# Patient Record
Sex: MALE | Race: WHITE | NOT HISPANIC OR LATINO | URBAN - METROPOLITAN AREA
[De-identification: names, ages, dates, MRNs, and addresses within clinical notes are randomized per-mention and may not be internally consistent; named-entity substitution may affect disease eponyms.]

---

## 2020-09-28 ENCOUNTER — OFFICE VISIT (OUTPATIENT)
Dept: URBAN - METROPOLITAN AREA CLINIC 121 | Facility: CLINIC | Age: 70
End: 2020-09-28

## 2021-04-21 ENCOUNTER — OFFICE VISIT (OUTPATIENT)
Dept: URBAN - METROPOLITAN AREA CLINIC 63 | Facility: CLINIC | Age: 71
End: 2021-04-21

## 2021-06-01 ENCOUNTER — OFFICE VISIT (OUTPATIENT)
Dept: URBAN - METROPOLITAN AREA SURGERY CENTER 4 | Facility: SURGERY CENTER | Age: 71
End: 2021-06-01

## 2021-09-21 ENCOUNTER — OFFICE VISIT (OUTPATIENT)
Dept: URBAN - METROPOLITAN AREA CLINIC 121 | Facility: CLINIC | Age: 71
End: 2021-09-21

## 2022-01-26 ENCOUNTER — OFFICE VISIT (OUTPATIENT)
Dept: URBAN - METROPOLITAN AREA CLINIC 63 | Facility: CLINIC | Age: 72
End: 2022-01-26

## 2022-05-10 ENCOUNTER — OFFICE VISIT (OUTPATIENT)
Dept: URBAN - METROPOLITAN AREA CLINIC 63 | Facility: CLINIC | Age: 72
End: 2022-05-10

## 2022-06-08 ENCOUNTER — OFFICE VISIT (OUTPATIENT)
Dept: URBAN - METROPOLITAN AREA SURGERY CENTER 4 | Facility: SURGERY CENTER | Age: 72
End: 2022-06-08

## 2022-06-13 LAB — PATHOLOGY (INDENTED REPORT): (no result)

## 2022-06-27 ENCOUNTER — OFFICE VISIT (OUTPATIENT)
Dept: URBAN - METROPOLITAN AREA CLINIC 63 | Facility: CLINIC | Age: 72
End: 2022-06-27

## 2022-06-27 ENCOUNTER — DASHBOARD ENCOUNTERS (OUTPATIENT)
Age: 72
End: 2022-06-27

## 2022-06-27 PROBLEM — 721691004: Status: ACTIVE | Noted: 2022-06-27

## 2022-06-27 PROBLEM — 397881000: Status: ACTIVE | Noted: 2022-06-27

## 2022-06-27 PROBLEM — 721704005: Status: ACTIVE | Noted: 2022-06-27

## 2022-06-27 RX ORDER — NITROGLYCERIN 0.4 MG/1
PRN TABLET SUBLINGUAL
Status: ACTIVE | COMMUNITY

## 2022-06-27 RX ORDER — SITAGLIPTIN 100 MG/1
1 TABLET TABLET, FILM COATED ORAL ONCE A DAY
Status: ACTIVE | COMMUNITY

## 2022-06-27 RX ORDER — METOPROLOL SUCCINATE 25 MG/1
1 TABLET TABLET, FILM COATED, EXTENDED RELEASE ORAL ONCE A DAY
Status: ACTIVE | COMMUNITY

## 2022-06-27 RX ORDER — EMPAGLIFLOZIN 25 MG/1
1 TABLET TABLET, FILM COATED ORAL ONCE A DAY
Status: ACTIVE | COMMUNITY

## 2022-06-27 RX ORDER — ASPIRIN 81 MG/1
1 TABLET TABLET, COATED ORAL ONCE A DAY
Status: ACTIVE | COMMUNITY

## 2022-06-27 RX ORDER — SENNOSIDES 50; 8.6 MG/1; MG/1
ONCE A DAY TABLET ORAL
Status: ACTIVE | COMMUNITY

## 2022-06-27 RX ORDER — METFORMIN HYDROCHLORIDE 1000 MG/1
1 TABLET TABLET, FILM COATED ORAL TWICE A DAY
Status: ACTIVE | COMMUNITY

## 2022-06-27 RX ORDER — LISINOPRIL 2.5 MG/1
1 TABLET TABLET ORAL ONCE A DAY
Status: ACTIVE | COMMUNITY

## 2022-06-27 RX ORDER — ATORVASTATIN CALCIUM 80 MG/1
1 TABLET TABLET, FILM COATED ORAL ONCE A DAY
Status: ACTIVE | COMMUNITY

## 2022-06-27 NOTE — HPI-TODAY'S VISIT:
**************************  Colonoscopy/08 June 2022.  10 mm advanced tubular adenomatous polyp removed from the proximal descending colon.  6 mm hyperplastic polyp removed in the rectum.  Diverticulosis in the descending and sigmoid colon with internal hemorrhoids present.  All remaining findings are negative or benign.  Recommend repeat colonoscopy in 3 years due to advanced adenomatous polyp removed on this procedure.  Lab work dated 26 January 2022 demonstrates the following abnormalities: CO2 21, glucose 142, BUN 22, BUN/creatinine ratio 27.2, hemoglobin A1c 7.3%, HDL 31.  All remaining lab values of CBC, CMP, B12, folate, iron studies and lipid panel are within normal limits.  Lab work dated 29 December 2020 demonstrates the following abnormalities: RBC 4.17, MCH 34.1, glucose 110, hemoglobin A1c 6.1%.  All remaining lab values of CBC, CMP and lipid panel are within normal limits.  Colonoscopy/08 October 2015.  7 mm tubular adenomatous polyp removed from the mid descending colon.  Diverticulosis in the descending and sigmoid colon with internal hemorrhoids present.  All remaining findings are negative or benign.  Recommend repeat colonoscopy in 5 years due to diminutive tubular adenomatous polyp removed on this procedure.

## 2022-07-09 ENCOUNTER — TELEPHONE ENCOUNTER (OUTPATIENT)
Dept: URBAN - METROPOLITAN AREA CLINIC 121 | Facility: CLINIC | Age: 72
End: 2022-07-09

## 2022-07-09 RX ORDER — ATORVASTATIN CALCIUM 80 MG/1
TABLET, FILM COATED ORAL
Refills: 0 | OUTPATIENT
Start: 2021-03-24 | End: 2022-05-10

## 2022-07-09 RX ORDER — METFORMIN HCL 500 MG/1
TABLET ORAL
Refills: 0 | OUTPATIENT
Start: 2015-09-03 | End: 2021-04-20

## 2022-07-09 RX ORDER — CALCIUM CARBONATE/VITAMIN D3 600 MG-10
TABLET ORAL
Refills: 0 | OUTPATIENT
Start: 2015-09-03 | End: 2022-05-10

## 2022-07-09 RX ORDER — METOPROLOL SUCCINATE 25 MG/1
TABLET, EXTENDED RELEASE ORAL
Refills: 0 | OUTPATIENT
Start: 2021-03-24 | End: 2022-05-10

## 2022-07-09 RX ORDER — LISINOPRIL 2.5 MG/1
TABLET ORAL
Refills: 0 | OUTPATIENT
Start: 2021-03-24 | End: 2022-05-10

## 2022-07-09 RX ORDER — METFORMIN HCL 1000 MG/1
TABLET ORAL
Refills: 0 | OUTPATIENT
Start: 2021-01-21 | End: 2022-05-10

## 2022-07-09 RX ORDER — ASPIRIN 81 MG/1
TABLET, CHEWABLE ORAL
Refills: 0 | OUTPATIENT
Start: 2015-09-03 | End: 2022-05-10

## 2022-07-09 RX ORDER — LISINOPRIL 5 MG/1
TABLET ORAL
Refills: 0 | OUTPATIENT
Start: 2015-09-03 | End: 2021-04-20

## 2022-07-09 RX ORDER — SILDENAFIL CITRATE 100 MG/1
TABLET ORAL
Refills: 0 | OUTPATIENT
Start: 2021-04-09 | End: 2022-05-10

## 2022-07-09 RX ORDER — ATORVASTATIN CALCIUM 20 MG/1
TABLET, FILM COATED ORAL
Refills: 0 | OUTPATIENT
Start: 2015-09-03 | End: 2021-04-20

## 2022-07-09 RX ORDER — METOPROLOL SUCCINATE 25 MG/1
TABLET, EXTENDED RELEASE ORAL
Refills: 0 | OUTPATIENT
Start: 2015-09-03 | End: 2022-05-10

## 2022-07-09 RX ORDER — SITAGLIPTIN PHOSPHATE 100 MG
TABLET ORAL
Refills: 0 | OUTPATIENT
Start: 2015-09-03 | End: 2022-05-10

## 2022-07-10 ENCOUNTER — TELEPHONE ENCOUNTER (OUTPATIENT)
Dept: URBAN - METROPOLITAN AREA CLINIC 121 | Facility: CLINIC | Age: 72
End: 2022-07-10

## 2022-07-10 RX ORDER — ATORVASTATIN CALCIUM 80 MG/1
TABLET, FILM COATED ORAL ONCE A DAY
Refills: 0 | Status: ACTIVE | COMMUNITY
Start: 2022-05-10

## 2022-07-10 RX ORDER — LISINOPRIL 2.5 MG/1
TABLET ORAL ONCE A DAY
Refills: 0 | Status: ACTIVE | COMMUNITY
Start: 2022-05-10

## 2022-07-10 RX ORDER — METOPROLOL SUCCINATE 25 MG/1
TABLET, EXTENDED RELEASE ORAL ONCE A DAY
Refills: 0 | Status: ACTIVE | COMMUNITY
Start: 2022-05-10

## 2022-07-10 RX ORDER — EMPAGLIFLOZIN 25 MG/1
TABLET, FILM COATED ORAL ONCE A DAY
Refills: 0 | Status: ACTIVE | COMMUNITY
Start: 2022-05-10

## 2022-07-10 RX ORDER — METFORMIN HCL 1000 MG/1
TABLET ORAL TWICE A DAY
Refills: 0 | Status: ACTIVE | COMMUNITY
Start: 2022-05-10

## 2022-07-10 RX ORDER — NITROGLYCERIN 0.4 MG/1
TABLET SUBLINGUAL AS NEEDED
Refills: 0 | Status: ACTIVE | COMMUNITY
Start: 2022-05-10

## 2022-07-10 RX ORDER — ASPIRIN 81 MG/1
TABLET, CHEWABLE ORAL ONCE A DAY
Refills: 0 | Status: ACTIVE | COMMUNITY
Start: 2022-05-10

## 2022-07-10 RX ORDER — SITAGLIPTIN PHOSPHATE 100 MG
TABLET ORAL ONCE A DAY
Refills: 0 | Status: ACTIVE | COMMUNITY
Start: 2022-05-10

## 2022-07-10 RX ORDER — FLAXSEED OIL 1000 MG
CAPSULE ORAL TWICE A DAY
Refills: 0 | Status: ACTIVE | COMMUNITY
Start: 2022-05-10